# Patient Record
Sex: FEMALE
[De-identification: names, ages, dates, MRNs, and addresses within clinical notes are randomized per-mention and may not be internally consistent; named-entity substitution may affect disease eponyms.]

---

## 2020-05-21 ENCOUNTER — RX ONLY (OUTPATIENT)
Age: 60
Setting detail: RX ONLY
End: 2020-05-21

## 2021-07-21 ENCOUNTER — RX ONLY (OUTPATIENT)
Age: 61
Setting detail: RX ONLY
End: 2021-07-21

## 2021-07-27 ENCOUNTER — APPOINTMENT (RX ONLY)
Dept: URBAN - METROPOLITAN AREA CLINIC 76 | Facility: CLINIC | Age: 61
Setting detail: DERMATOLOGY
End: 2021-07-27

## 2021-07-27 DIAGNOSIS — L98.8 OTHER SPECIFIED DISORDERS OF THE SKIN AND SUBCUTANEOUS TISSUE: ICD-10-CM

## 2021-07-27 PROCEDURE — ? ADDITIONAL NOTES

## 2021-07-27 PROCEDURE — ? BOTOX

## 2021-07-27 PROCEDURE — ? JUVEDERM ULTRA XC INJECTION

## 2021-07-27 NOTE — PROCEDURE: BOTOX
Left Pupillary Line Units: 0
Additional Area 1 Location: Upper Lip
Price (Use Numbers Only, No Special Characters Or $): 86
Show Depressor Anguli Units: Yes
Additional Area 1 Units: 2
Reconstitution Date (Optional): 7-27-21
Consent: Written consent obtained. Risks include but not limited to lid/brow ptosis, bruising, swelling, diplopia, temporary effect, incomplete chemical denervation.
Show Right And Left Pupillary Line Units: No
Dilution (U/0.1 Cc): 10
Additional Area 3 Location: mid 
Additional Area 6 Location: depressor labii inferiosis
Lot #: [id number]
Expiration Date (Month Year): 10/31/2023
Post-Care Instructions: Patient instructed to not rub or massage areas injected, not to lie down for 4 hours and to limit physical activity for 24 hours. Patient instructed not to travel by airplane for 48 hours. Patient instructed to animate the muscle groups that were injected today every 5-6 minutes for the next 3-4 hours.
Additional Area 2 Location: lateral corrugators
Detail Level: Detailed
Additional Area 5 Location: tails
Additional Area 4 Location: samantha

## 2021-07-27 NOTE — PROCEDURE: ADDITIONAL NOTES
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 Phenol peel with Dr. Tanner\\n2. Sciton laser to upper lip only - risk of hypopigmentation discussed\\n3. ThermiSmooth\\n4. Fraxel
Detail Level: Simple
Render Risk Assessment In Note?: no

## 2021-07-27 NOTE — PROCEDURE: JUVEDERM ULTRA XC INJECTION
Brows Filler Volume In Cc: 0
Lot #: [id number]
Anesthesia Volume In Cc: 0.5
Map Statment: See Attach Map for Complete Details
Post-Care Instructions: Patient instructed to apply ice to reduce swelling. Discussed signs and symptoms of vascular compromise. Patient was instructed to call the office immediately.
Consent: Written consent obtained. Risks include but not limited to bruising, beading, irregular texture, ulceration, infection, allergic reaction, scar formation, incomplete augmentation, temporary nature, procedural pain.
Additional Area 1 Location: numerous perioral rhytides
Price (Use Numbers Only, No Special Characters Or $): 965.05
Use Map Statement For Sites (Optional): Yes
Procedural Text: The filler was administered to the treatment areas noted above.
Expiration Date (Month Year): 1-
Detail Level: Simple
Filler: Juvederm Ultra XC
Number Of Syringes (Required For Inventory): 2
Include Cannula Information In Note?: No
Additional Area 1 Volume In Cc: 1

## 2021-07-30 ENCOUNTER — APPOINTMENT (RX ONLY)
Dept: URBAN - METROPOLITAN AREA CLINIC 76 | Facility: CLINIC | Age: 61
Setting detail: DERMATOLOGY
End: 2021-07-30

## 2021-07-30 DIAGNOSIS — R23.3 SPONTANEOUS ECCHYMOSES: ICD-10-CM

## 2021-07-30 PROCEDURE — ? CYNERGY LASER

## 2021-07-30 PROCEDURE — ? COUNSELING

## 2021-07-30 ASSESSMENT — LOCATION ZONE DERM: LOCATION ZONE: FACE

## 2021-07-30 ASSESSMENT — LOCATION SIMPLE DESCRIPTION DERM: LOCATION SIMPLE: LEFT CHEEK

## 2021-07-30 ASSESSMENT — LOCATION DETAILED DESCRIPTION DERM: LOCATION DETAILED: LEFT INFERIOR CENTRAL MALAR CHEEK

## 2021-07-30 NOTE — PROCEDURE: CYNERGY LASER
External Cooling: Erwin Cryo 5
Pulse Width (Ms): 6
Pulse Width (Ms): 0.3
Spot Size In Mm: 1.5
Pdl Fluence In J/Cm2: 4
Consent: Written consent obtained, risks reviewed including but not limited to crusting, scabbing, blistering, scarring, darker or lighter pigmentary change, systemic reactions, ulceration, incidental hair removal, bruising, and/or incomplete removal.
Cooling: SmartCool
Post-Care Instructions: I reviewed with the patient in detail post-care instructions. Patient should avoid sunlight and wear sun protection.
Price (Use Numbers Only, No Special Characters Or $): 0
Spot Size In Mm: 7
Detail Level: Zone
Rep Rate (Hz): single pulse
Pulse Group 1 - PDL 0.5ms/Nd:YAG 15ms
Delay Setting: Short (100ms)
Treatment Number: 1
Total Pulses: 21
Spot Size In Mm: 10
External Cooling Fan Speed: 5